# Patient Record
Sex: FEMALE | Race: WHITE | NOT HISPANIC OR LATINO | URBAN - METROPOLITAN AREA
[De-identification: names, ages, dates, MRNs, and addresses within clinical notes are randomized per-mention and may not be internally consistent; named-entity substitution may affect disease eponyms.]

---

## 2017-01-31 ENCOUNTER — IMPORTED ENCOUNTER (OUTPATIENT)
Dept: URBAN - METROPOLITAN AREA CLINIC 9 | Facility: CLINIC | Age: 72
End: 2017-01-31

## 2017-11-17 NOTE — PATIENT DISCUSSION
Dry Eye Syndrome Counseling: I have discussed the diagnosis and the pathophysiology of this disease with the patient. Eyelid pathology and systemic illnesses such as Sjogren?s disease or rheumatoid arthritis may contribute to severity. Vision may be limited by dry eye, and symptoms exacerbated by environmental factors such as smoke, wind, or prolonged eye use. Treatment options include, but are not limited to, artificial tears, punctal plugs, topical cyclosporine, oral omega-3 supplements, and lubricating ointments. I stressed the importance of compliance with treatment.

## 2018-11-28 ENCOUNTER — IMPORTED ENCOUNTER (OUTPATIENT)
Dept: URBAN - METROPOLITAN AREA CLINIC 9 | Facility: CLINIC | Age: 73
End: 2018-11-28

## 2019-12-04 ENCOUNTER — IMPORTED ENCOUNTER (OUTPATIENT)
Dept: URBAN - METROPOLITAN AREA CLINIC 9 | Facility: CLINIC | Age: 74
End: 2019-12-04

## 2020-09-10 NOTE — PATIENT DISCUSSION
CONJUNCTIVITIS (ALLERGIC), OU:  RECOMMENDED ZADITOR BID OU OR PATADAY QD OU. RETURN FOR FOLLOW-UP AS SCHEDULED.

## 2021-02-03 ENCOUNTER — IMPORTED ENCOUNTER (OUTPATIENT)
Dept: URBAN - METROPOLITAN AREA CLINIC 9 | Facility: CLINIC | Age: 76
End: 2021-02-03

## 2021-02-03 PROBLEM — H04.123: Noted: 2021-02-03

## 2021-02-03 PROBLEM — H43.813: Noted: 2021-02-03

## 2021-02-03 PROBLEM — H40.013: Noted: 2021-02-03

## 2021-03-11 NOTE — PATIENT DISCUSSION
New Prescription: erythromycin (erythromycin): ointment: 5 mg/gram (0.5 %) a small amount at bedtime as directed on eyelid 03-

## 2021-03-11 NOTE — PATIENT DISCUSSION
DRY EYE WITH INFLAMMATION:  PRESERVATIVE FREE ARTIFICIAL TEARS Q2 HOURS, LID HYGIENE. PRESCRIBED EYSUVIUS BID-TID. CONSIDER RESTASIS  OR PUNCTAL PLUGS AND/OR LIPIFLOW AT FOLLOW. RETURN SOONER IF SYMPTOMS WORSEN.

## 2021-10-18 ASSESSMENT — VISUAL ACUITY
OS_SC: 20/20 SN
OD_CC: 20/20 SN
OS_CC: 20/20 SN
OD_SC: 20/20 SN
OS_SC: 20/20 SN
OD_SC: 20/20 SN
OS_CC: 20/20 SN
OD_CC: 20/20 SN
OD_SC: 20/20 SN
OS_CC: 20/20 SN
OD_SC: 20/20 SN
OS_CC: 20/20 SN
OS_SC: 20/20 SN
OD_CC: 20/20 SN
OD_CC: 20/20 SN
OS_SC: 20/20 SN

## 2021-10-18 ASSESSMENT — KERATOMETRY
OS_K2POWER_DIOPTERS: 44
OS_K1POWER_DIOPTERS: 43.75
OS_K2POWER_DIOPTERS: 44
OD_AXISANGLE2_DEGREES: 79
OD_AXISANGLE2_DEGREES: 82
OS_AXISANGLE2_DEGREES: 117
OD_K1POWER_DIOPTERS: 43.5
OD_AXISANGLE2_DEGREES: 61
OS_K1POWER_DIOPTERS: 43.75
OD_AXISANGLE_DEGREES: 151
OD_AXISANGLE2_DEGREES: 80
OD_K2POWER_DIOPTERS: 44.25
OS_K2POWER_DIOPTERS: 44
OS_AXISANGLE_DEGREES: 38
OD_K1POWER_DIOPTERS: 43.5
OD_K2POWER_DIOPTERS: 44
OS_AXISANGLE2_DEGREES: 128
OS_AXISANGLE_DEGREES: 37
OS_K1POWER_DIOPTERS: 43.5
OD_K1POWER_DIOPTERS: 43.5
OS_K2POWER_DIOPTERS: 44
OD_AXISANGLE_DEGREES: 172
OS_AXISANGLE_DEGREES: 27
OS_K1POWER_DIOPTERS: 43.75
OD_K2POWER_DIOPTERS: 43.75
OS_AXISANGLE_DEGREES: 27
OD_AXISANGLE_DEGREES: 169
OS_AXISANGLE2_DEGREES: 127
OD_K1POWER_DIOPTERS: 43.5
OS_AXISANGLE2_DEGREES: 117
OD_K2POWER_DIOPTERS: 44
OD_AXISANGLE_DEGREES: 170

## 2021-10-18 ASSESSMENT — TONOMETRY
OD_IOP_MMHG: 14
OS_IOP_MMHG: 11
OS_IOP_MMHG: 14
OS_IOP_MMHG: 13
OS_IOP_MMHG: 13
OD_IOP_MMHG: 11
OD_IOP_MMHG: 11
OD_IOP_MMHG: 10

## 2022-02-10 NOTE — PATIENT DISCUSSION
DRY EYE WITH INFLAMMATION:  PRESERVATIVE FREE ARTIFICIAL TEARS Q2 HOURS, LID HYGIENE. Rx Tyrvara nasal spray for LUNA to be used BID. EYSUVIUS BID-TID. Freshkote BID.  OR PUNCTAL PLUGS AND/OR LIPIFLOW AT FOLLOW. RETURN SOONER IF SYMPTOMS WORSEN.

## 2022-03-02 NOTE — PATIENT DISCUSSION
DRY EYE WITH INFLAMMATION:  PRESERVATIVE FREE ARTIFICIAL TEARS Q2 HOURS, LID HYGIENE. Tyrvara nasal spray for LUNA to be used BID. EYSUVIUS BID-TID. Refresh PF free and Refresh GEL PM. Proceed with referral to cornea specialists, Dr. Rich Dickson for OSD evaluation.

## 2022-03-23 ENCOUNTER — ESTABLISHED PATIENT (OUTPATIENT)
Dept: URBAN - METROPOLITAN AREA CLINIC 9 | Facility: CLINIC | Age: 77
End: 2022-03-23

## 2022-03-23 DIAGNOSIS — H04.123: ICD-10-CM

## 2022-03-23 DIAGNOSIS — L71.9: ICD-10-CM

## 2022-03-23 DIAGNOSIS — H43.813: ICD-10-CM

## 2022-03-23 DIAGNOSIS — H40.013: ICD-10-CM

## 2022-03-23 DIAGNOSIS — H52.223: ICD-10-CM

## 2022-03-23 DIAGNOSIS — E11.9: ICD-10-CM

## 2022-03-23 PROCEDURE — 92014 COMPRE OPH EXAM EST PT 1/>: CPT

## 2022-03-23 PROCEDURE — 92133 CPTRZD OPH DX IMG PST SGM ON: CPT

## 2022-03-23 PROCEDURE — 92015 DETERMINE REFRACTIVE STATE: CPT

## 2022-03-23 ASSESSMENT — KERATOMETRY
OD_K2POWER_DIOPTERS: 44.00
OD_AXISANGLE_DEGREES: 171
OS_AXISANGLE2_DEGREES: 112
OS_AXISANGLE_DEGREES: 22
OD_K1POWER_DIOPTERS: 43.50
OD_AXISANGLE2_DEGREES: 81
OS_K2POWER_DIOPTERS: 44.50
OS_K1POWER_DIOPTERS: 44.00

## 2022-03-23 ASSESSMENT — TONOMETRY
OD_IOP_MMHG: 14
OS_IOP_MMHG: 14

## 2022-03-23 ASSESSMENT — VISUAL ACUITY
OS_SC: 20/20
OD_SC: 20/25+2
OU_SC: 20/20

## 2022-05-26 NOTE — PATIENT DISCUSSION
Lens Material: DISPLAY PLAN FREE TEXT DISPLAY PLAN FREE TEXT DISPLAY PLAN FREE TEXT DISPLAY PLAN FREE TEXT DISPLAY PLAN FREE TEXT DISPLAY PLAN FREE TEXT DISPLAY PLAN FREE TEXT

## 2022-06-22 RX ORDER — LISINOPRIL 20 MG/1
TABLET ORAL
COMMUNITY

## 2022-06-22 RX ORDER — LEVOTHYROXINE SODIUM 0.1 MG/1
TABLET ORAL
COMMUNITY

## 2022-06-22 RX ORDER — TRAZODONE HYDROCHLORIDE 100 MG/1
TABLET ORAL
COMMUNITY

## 2022-06-22 RX ORDER — SODIUM PHOSPHATE,MONO-DIBASIC 19G-7G/118
ENEMA (ML) RECTAL
COMMUNITY

## 2022-06-22 RX ORDER — DIPHENHYDRAMINE HCL 25 MG
TABLET ORAL
COMMUNITY

## 2022-06-23 NOTE — PATIENT DISCUSSION
Glasses Rx given. Staff message from Mike Yadav   From: Mike Yadav MD   Sent: 6/15/2022   3:46 PM EDT   To: DEEPTHI Lovell, Sarah Perrin DO   Subject: Follow-up post hospital                           I had virtual medicine TCM with patient today  [facility] is home from the hospital once again  Marvin Chua and his wife are not exactly sure when he should make appointments to follow-up   Did have laboratory studies yesterday  Fredrick Reveal your staff please contact patient and arrange for appropriate follow-up   Thanks         [facility] [place] 6-  [facility] [place] 6-  [facility] [place] 6-

## 2022-09-06 PROBLEM — M15.9 OSTEOARTHRITIS OF MULTIPLE JOINTS: Status: ACTIVE | Noted: 2022-09-06

## 2022-09-06 PROBLEM — J30.9 ALLERGIC RHINITIS, UNSPECIFIED: Status: ACTIVE | Noted: 2022-09-06

## 2022-09-06 PROBLEM — K42.9 UMBILICAL HERNIA: Status: ACTIVE | Noted: 2022-09-06

## 2022-09-06 PROBLEM — I10 HYPERTENSION, ESSENTIAL: Status: ACTIVE | Noted: 2022-09-06

## 2022-09-06 PROBLEM — E78.5 HYPERLIPIDEMIA: Status: ACTIVE | Noted: 2022-09-06

## 2022-09-06 PROBLEM — E03.9 HYPOTHYROIDISM (ACQUIRED): Status: ACTIVE | Noted: 2022-09-06

## 2022-09-06 PROBLEM — F41.1 GENERALIZED ANXIETY DISORDER: Status: ACTIVE | Noted: 2022-09-06

## 2022-09-06 PROBLEM — E11.9 DIABETES (HCC): Status: ACTIVE | Noted: 2022-09-06

## 2022-09-06 PROBLEM — M47.816 LUMBAR FACET ARTHROPATHY: Status: ACTIVE | Noted: 2018-07-26

## 2022-09-06 PROBLEM — E55.9 VITAMIN D DEFICIENCY: Status: ACTIVE | Noted: 2022-09-06

## 2022-09-06 PROBLEM — N32.81 OVERACTIVE BLADDER: Status: ACTIVE | Noted: 2022-09-06

## 2022-09-06 PROBLEM — J44.9 COPD (CHRONIC OBSTRUCTIVE PULMONARY DISEASE) (HCC): Status: ACTIVE | Noted: 2022-09-06

## 2022-09-06 PROBLEM — G47.00 INSOMNIA: Status: ACTIVE | Noted: 2022-09-06

## 2022-09-06 PROBLEM — M51.36 DEGENERATIVE DISC DISEASE, LUMBAR: Status: ACTIVE | Noted: 2018-07-26

## 2022-09-06 PROBLEM — M48.061 LUMBAR SPINAL STENOSIS: Status: ACTIVE | Noted: 2017-01-11

## 2022-09-06 PROBLEM — G56.00 CARPAL TUNNEL SYNDROME: Status: ACTIVE | Noted: 2022-09-06

## 2022-09-06 PROBLEM — G62.9 NEUROPATHY: Status: ACTIVE | Noted: 2022-09-06

## 2022-09-06 PROBLEM — E83.110 HEREDITARY HEMOCHROMATOSIS (HCC): Status: ACTIVE | Noted: 2022-09-06

## 2022-09-06 PROBLEM — C44.91 BASAL CELL CARCINOMA: Status: ACTIVE | Noted: 2022-09-06

## 2024-04-03 ENCOUNTER — ESTABLISHED PATIENT (OUTPATIENT)
Facility: LOCATION | Age: 79
End: 2024-04-03

## 2024-04-03 DIAGNOSIS — H04.123: ICD-10-CM

## 2024-04-03 DIAGNOSIS — E11.9: ICD-10-CM

## 2024-04-03 DIAGNOSIS — H40.013: ICD-10-CM

## 2024-04-03 DIAGNOSIS — H52.223: ICD-10-CM

## 2024-04-03 PROCEDURE — 92133 CPTRZD OPH DX IMG PST SGM ON: CPT

## 2024-04-03 PROCEDURE — 92015 DETERMINE REFRACTIVE STATE: CPT

## 2024-04-03 PROCEDURE — 92014 COMPRE OPH EXAM EST PT 1/>: CPT

## 2024-04-03 ASSESSMENT — KERATOMETRY
OS_K1POWER_DIOPTERS: 43.75
OD_K2POWER_DIOPTERS: 44.00
OS_AXISANGLE_DEGREES: 28
OS_AXISANGLE2_DEGREES: 118
OS_K2POWER_DIOPTERS: 44.00
OD_AXISANGLE2_DEGREES: 2
OD_AXISANGLE_DEGREES: 92
OD_K1POWER_DIOPTERS: 43.50

## 2024-04-03 ASSESSMENT — TONOMETRY
OD_IOP_MMHG: 13
OS_IOP_MMHG: 14

## 2024-04-03 ASSESSMENT — VISUAL ACUITY
OD_SC: 20/20
OS_SC: 20/25
OS_BCVA: 20/20
OD_BCVA: 20/20

## 2025-04-28 ENCOUNTER — COMPREHENSIVE EXAM (OUTPATIENT)
Age: 80
End: 2025-04-28

## 2025-04-28 DIAGNOSIS — H04.123: ICD-10-CM

## 2025-04-28 DIAGNOSIS — E11.9: ICD-10-CM

## 2025-04-28 DIAGNOSIS — H52.223: ICD-10-CM

## 2025-04-28 DIAGNOSIS — H40.013: ICD-10-CM

## 2025-04-28 DIAGNOSIS — L71.9: ICD-10-CM

## 2025-04-28 DIAGNOSIS — H43.813: ICD-10-CM

## 2025-04-28 PROCEDURE — 92133 CPTRZD OPH DX IMG PST SGM ON: CPT

## 2025-04-28 PROCEDURE — 92014 COMPRE OPH EXAM EST PT 1/>: CPT

## 2025-04-28 PROCEDURE — 92015 DETERMINE REFRACTIVE STATE: CPT
